# Patient Record
Sex: MALE
[De-identification: names, ages, dates, MRNs, and addresses within clinical notes are randomized per-mention and may not be internally consistent; named-entity substitution may affect disease eponyms.]

---

## 2021-03-16 PROBLEM — Z00.00 ENCOUNTER FOR PREVENTIVE HEALTH EXAMINATION: Status: ACTIVE | Noted: 2021-03-16

## 2021-03-17 ENCOUNTER — APPOINTMENT (OUTPATIENT)
Dept: NEUROSURGERY | Facility: CLINIC | Age: 36
End: 2021-03-17
Payer: MEDICAID

## 2021-03-17 DIAGNOSIS — M54.16 RADICULOPATHY, LUMBAR REGION: ICD-10-CM

## 2021-03-17 DIAGNOSIS — F17.200 NICOTINE DEPENDENCE, UNSPECIFIED, UNCOMPLICATED: ICD-10-CM

## 2021-03-17 DIAGNOSIS — Z78.9 OTHER SPECIFIED HEALTH STATUS: ICD-10-CM

## 2021-03-17 PROCEDURE — 99202 OFFICE O/P NEW SF 15 MIN: CPT

## 2021-03-17 PROCEDURE — 99072 ADDL SUPL MATRL&STAF TM PHE: CPT

## 2021-03-17 RX ORDER — ASCORBIC ACID 500 MG
TABLET,CHEWABLE ORAL
Refills: 0 | Status: ACTIVE | COMMUNITY

## 2021-03-19 PROBLEM — M54.16 LUMBAR RADICULOPATHY: Status: ACTIVE | Noted: 2021-03-17

## 2021-03-19 NOTE — PHYSICAL EXAM
[General Appearance - Alert] : alert [General Appearance - In No Acute Distress] : in no acute distress [General Appearance - Well Nourished] : well nourished [Person] : oriented to person [Place] : oriented to place [Time] : oriented to time [Cranial Nerves Optic (II)] : visual acuity intact bilaterally,  pupils equal round and reactive to light [Cranial Nerves Oculomotor (III)] : extraocular motion intact [Cranial Nerves Trigeminal (V)] : facial sensation intact symmetrically [Cranial Nerves Facial (VII)] : face symmetrical [Cranial Nerves Vestibulocochlear (VIII)] : hearing was intact bilaterally [Cranial Nerves Glossopharyngeal (IX)] : tongue and palate midline [Cranial Nerves Accessory (XI - Cranial And Spinal)] : head turning and shoulder shrug symmetric [Cranial Nerves Hypoglossal (XII)] : there was no tongue deviation with protrusion [Motor Tone] : muscle tone was normal in all four extremities [Motor Strength] : muscle strength was normal in all four extremities [Abnormal Walk] : normal gait [Balance] : balance was intact [2+] : Brachioradialis left 2+ [No Tenderness to Palpation] : no spine tenderness on palpation [Straight-Leg Raise Test - Right] : straight leg raise of the right leg was positive [Normal] : normal [Able to toe walk] : the patient was able to toe walk [Able to heel walk] : the patient was able to heel walk [FreeTextEntry5] : TERA negative

## 2021-03-19 NOTE — PLAN
[FreeTextEntry1] : I am ordering MRI of the lumbar spine w/o contrast to assess for HNP and/or stenosis. I can call him with the results or he can follow up once it is completed. In the meantime, I advise him since the injection he is receiving from pain management is working for him he can continue with that.

## 2021-03-19 NOTE — HISTORY OF PRESENT ILLNESS
[FreeTextEntry1] : right sided LBP radiating to the RLE [de-identified] : This is 35 yrs old Romanian-speaking only male who presents today reporting of right sided LBP radiating to the right posterior leg to the dorsum of the foot for 6 to 7 years. Denies tingling, numbness, weakness, perineal numbness, bladder/bowel incontinence and erectile dysfunction. He attended PT last year which alleviated his symptom slightly. He has received Right L4-5 TFESI x 2 from Dr. Vaz office, and both times, had relief of his pain. Symptom is aggravated with prolong sitting, and it is alleviated when lying down. He has no recent MRI imaging.

## 2021-03-19 NOTE — REASON FOR VISIT
[New Patient Visit] : a new patient visit [Referred By: _________] : Patient was referred by TIFFANI [[employer] ] : provided by [employer]   [FreeTextEntry1] : [id number] [TWNoteComboBox1] : Citizen of [place]